# Patient Record
Sex: FEMALE | Race: WHITE | Employment: UNEMPLOYED | URBAN - METROPOLITAN AREA
[De-identification: names, ages, dates, MRNs, and addresses within clinical notes are randomized per-mention and may not be internally consistent; named-entity substitution may affect disease eponyms.]

---

## 2017-08-30 ENCOUNTER — PATIENT OUTREACH (OUTPATIENT)
Dept: FAMILY MEDICINE CLINIC | Facility: CLINIC | Age: 42
End: 2017-08-30

## 2017-10-10 ENCOUNTER — HOSPITAL ENCOUNTER (OUTPATIENT)
Age: 42
Discharge: HOME | End: 2017-10-10
Attending: FAMILY MEDICINE
Payer: COMMERCIAL

## 2017-10-10 ENCOUNTER — APPOINTMENT (OUTPATIENT)
Dept: CT IMAGING | Age: 42
End: 2017-10-10
Attending: FAMILY MEDICINE
Payer: COMMERCIAL

## 2017-10-10 VITALS
DIASTOLIC BLOOD PRESSURE: 68 MMHG | TEMPERATURE: 98 F | HEART RATE: 70 BPM | SYSTOLIC BLOOD PRESSURE: 120 MMHG | RESPIRATION RATE: 16 BRPM | OXYGEN SATURATION: 100 %

## 2017-10-10 DIAGNOSIS — N83.202 CYSTS OF BOTH OVARIES: ICD-10-CM

## 2017-10-10 DIAGNOSIS — R10.12 ABDOMINAL PAIN, LEFT UPPER QUADRANT: ICD-10-CM

## 2017-10-10 DIAGNOSIS — R07.81 RIB PAIN ON LEFT SIDE: Primary | ICD-10-CM

## 2017-10-10 DIAGNOSIS — K80.20 CALCULUS OF GALLBLADDER WITHOUT CHOLECYSTITIS WITHOUT OBSTRUCTION: ICD-10-CM

## 2017-10-10 DIAGNOSIS — N83.201 CYSTS OF BOTH OVARIES: ICD-10-CM

## 2017-10-10 PROCEDURE — 83690 ASSAY OF LIPASE: CPT | Performed by: FAMILY MEDICINE

## 2017-10-10 PROCEDURE — 85025 COMPLETE CBC W/AUTO DIFF WBC: CPT | Performed by: FAMILY MEDICINE

## 2017-10-10 PROCEDURE — 74177 CT ABD & PELVIS W/CONTRAST: CPT | Performed by: FAMILY MEDICINE

## 2017-10-10 PROCEDURE — 80076 HEPATIC FUNCTION PANEL: CPT | Performed by: FAMILY MEDICINE

## 2017-10-10 PROCEDURE — 81002 URINALYSIS NONAUTO W/O SCOPE: CPT | Performed by: FAMILY MEDICINE

## 2017-10-10 PROCEDURE — 80047 BASIC METABLC PNL IONIZED CA: CPT

## 2017-10-10 PROCEDURE — 99215 OFFICE O/P EST HI 40 MIN: CPT

## 2017-10-10 PROCEDURE — 81025 URINE PREGNANCY TEST: CPT | Performed by: FAMILY MEDICINE

## 2017-10-10 PROCEDURE — 99204 OFFICE O/P NEW MOD 45 MIN: CPT

## 2017-10-10 PROCEDURE — 36415 COLL VENOUS BLD VENIPUNCTURE: CPT

## 2017-10-10 RX ORDER — METAXALONE 800 MG/1
800 TABLET ORAL 3 TIMES DAILY
Qty: 21 TABLET | Refills: 0 | Status: SHIPPED | OUTPATIENT
Start: 2017-10-10 | End: 2017-10-16

## 2017-10-10 NOTE — ED INITIAL ASSESSMENT (HPI)
Patient c/o intermittent left side abd/ribcage pain that started about a week ago. Pain is worse after eating.  Denies n/v/d.

## 2017-10-11 NOTE — ED PROVIDER NOTES
Patient Seen in: [address] [facility]    History   Patient presents with:  Abdominal Pain    Stated Complaint: left side pain    HPI    50-year-old female who presents to the clinic today with chief complaints of intermittent left-sided upper [10/10/17 1501]  Pulse: 67 [10/10/17 1501]  Resp: 16 [10/10/17 1501]  Temp: (!) 97 °F (36.1 °C) [10/10/17 1501]  Temp src: Temporal [10/10/17 1501]  SpO2: 100 % [10/10/17 1501]  O2 Device: None (Room air) [10/10/17 1557]    Current:/68   Pulse 70   T symphysis with non-ionic intravenous contrast material. Post contrast coronal MPR imaging was performed. Dose reduction techniques were used.  Dose information is transmitted to the ACR ([address] [place] [address] of Radiology) Renae Dunham 35 ([facility] CBC was unremarkable  I-STAT unremarkable  UA negative  Urine pregnancy negative  CT abdomen and pelvis positive for extensive cholelithiasis. Bilateral ovarian cysts. Hepatic function panel ordered. Lipase ordered.     60-year-old female presenting Medication List as of 10/10/2017  5:52 PM    START taking these medications    Metaxalone (SKELAXIN) 800 MG Oral Tab  Take 1 tablet (800 mg total) by mouth 3 (three) times daily. , Normal, Disp-21 tablet, R-0

## 2017-10-12 ENCOUNTER — TELEPHONE (OUTPATIENT)
Dept: FAMILY MEDICINE CLINIC | Facility: CLINIC | Age: 42
End: 2017-10-12

## 2017-10-12 ENCOUNTER — OFFICE VISIT (OUTPATIENT)
Dept: FAMILY MEDICINE CLINIC | Facility: CLINIC | Age: 42
End: 2017-10-12

## 2017-10-12 VITALS
DIASTOLIC BLOOD PRESSURE: 74 MMHG | HEART RATE: 120 BPM | TEMPERATURE: 99 F | WEIGHT: 141.63 LBS | SYSTOLIC BLOOD PRESSURE: 118 MMHG | HEIGHT: 64 IN | RESPIRATION RATE: 14 BRPM | BODY MASS INDEX: 24.18 KG/M2 | OXYGEN SATURATION: 100 %

## 2017-10-12 DIAGNOSIS — K29.70 GASTRITIS WITHOUT BLEEDING, UNSPECIFIED CHRONICITY, UNSPECIFIED GASTRITIS TYPE: ICD-10-CM

## 2017-10-12 DIAGNOSIS — R10.13 EPIGASTRIC PAIN: ICD-10-CM

## 2017-10-12 DIAGNOSIS — K80.80 BILIARY CALCULUS OF OTHER SITE WITHOUT OBSTRUCTION: Primary | ICD-10-CM

## 2017-10-12 DIAGNOSIS — K29.50 CHRONIC GASTRITIS WITHOUT BLEEDING, UNSPECIFIED GASTRITIS TYPE: ICD-10-CM

## 2017-10-12 DIAGNOSIS — K80.20 CALCULUS OF GALLBLADDER WITHOUT CHOLECYSTITIS WITHOUT OBSTRUCTION: Primary | ICD-10-CM

## 2017-10-12 PROCEDURE — 99213 OFFICE O/P EST LOW 20 MIN: CPT | Performed by: FAMILY MEDICINE

## 2017-10-12 NOTE — PROGRESS NOTES
Nola Carlson is a 40year old female. No chief complaint on file. HPI:   Pain and discomfort started a week ago, worse with food. Cramping under her ribs. Decreased appetite. No fevers, chills or sweats.  Even water hurts her, just a few minutes 9.6 oz   SpO2 100%   BMI 24.31 kg/m²  Body mass index is 24.31 kg/m².     GENERAL: well developed, well nourished,in no apparent distress  SKIN: no rashes,no suspicious lesions  HEENT: atraumatic, normocephalic,ears and throat are clear  NECK: supple,no makeda

## 2017-10-12 NOTE — TELEPHONE ENCOUNTER
Patient notified and verbalized understanding.    Contact info for Dr Hu Del Real and Dr Otis De provided

## 2017-10-12 NOTE — TELEPHONE ENCOUNTER
Patient states the first available appointment is 10/25 with Dr Melinda Whaley and patient would like to get in with someone sooner.     Patient would also like the referral for GI

## 2017-10-12 NOTE — TELEPHONE ENCOUNTER
Referrals placed. For surgery: Dr. Deangelo Mcpherson, or one of her partners might be able to get her in sooner. Otherwise, two weeks isn't terrible if you have to wait. GI: Dr. Jessica Christensen.  I would see the surgeon first.

## 2017-10-12 NOTE — TELEPHONE ENCOUNTER
Pt called stated she has a gallbladder consult appt in [facility] on 10/25 and she was wondering if she could get in sooner than that.

## 2017-10-12 NOTE — TELEPHONE ENCOUNTER
Phone call to patient. States she was scheduled sooner with another surgeon.   Future Appointments  Date Time Provider Kelsey Watt   10/16/2017 10:30 AM More Curran MD [facility] General

## 2017-10-12 NOTE — TELEPHONE ENCOUNTER
Pt called, does Dr. Divine Velasco want to refer pt to a gastro Dr?  And can Dr. Divine Velasco give pt the name of a different surgeon for the gallbladder issue?   Please call pt at [phone]

## 2017-10-16 ENCOUNTER — OFFICE VISIT (OUTPATIENT)
Dept: SURGERY | Facility: CLINIC | Age: 42
End: 2017-10-16

## 2017-10-16 ENCOUNTER — TELEPHONE (OUTPATIENT)
Dept: SURGERY | Facility: CLINIC | Age: 42
End: 2017-10-16

## 2017-10-16 VITALS
TEMPERATURE: 98 F | DIASTOLIC BLOOD PRESSURE: 83 MMHG | BODY MASS INDEX: 24.07 KG/M2 | SYSTOLIC BLOOD PRESSURE: 138 MMHG | WEIGHT: 141 LBS | HEIGHT: 64 IN | HEART RATE: 53 BPM

## 2017-10-16 DIAGNOSIS — K80.20 CALCULUS OF GALLBLADDER WITHOUT CHOLECYSTITIS WITHOUT OBSTRUCTION: Primary | ICD-10-CM

## 2017-10-16 DIAGNOSIS — K85.10 GALLSTONE PANCREATITIS: ICD-10-CM

## 2017-10-16 DIAGNOSIS — K80.18 CALCULUS OF GALLBLADDER WITH OTHER CHOLECYSTITIS WITHOUT OBSTRUCTION: Primary | ICD-10-CM

## 2017-10-16 PROCEDURE — 99204 OFFICE O/P NEW MOD 45 MIN: CPT | Performed by: COLON & RECTAL SURGERY

## 2017-10-16 RX ORDER — MULTIVIT-MIN/IRON FUM/FOLIC AC 7.5 MG-4
1 TABLET ORAL DAILY
COMMUNITY
End: 2019-09-26 | Stop reason: ALTCHOICE

## 2017-10-16 NOTE — H&P
New Patient Visit Note       Active Problems      1. Calculus of gallbladder without cholecystitis without obstruction    2.  Gallstone pancreatitis        Chief Complaint   Abdominal pain    History of Present Illness   Mohsen Esteves is a 28-year-old w medications    She is  and has 4 children. She currently does not work. Allergies  Corine is allergic to latex and pork derived products.     Past Medical / Surgical / Social / Family History    The past medical and past surgical history have pain and nausea. Negative for abdominal distention, anal bleeding, blood in stool, constipation, diarrhea and vomiting. Genitourinary: Negative for difficulty urinating, dysuria, frequency and urgency.    Musculoskeletal: Negative for arthralgias and myal Index Registry. PATIENT STATED HISTORY:(As transcribed by Technologist)  Patient has left upper abdomen pain in the area of left ribs. No injury. Pain is intermittent for one week. Pain is worse after eating.       CONTRAST USED:  80cc of Omnipaque 350 Calculus of gallbladder without cholecystitis without obstruction  (primary encounter diagnosis)  Gallstone pancreatitis     The patient does not have acute cholecystitis.   She likely had an episode of mild gallstone pancreatitis at the time of her prese

## 2017-10-16 NOTE — PATIENT INSTRUCTIONS
Assessment   Calculus of gallbladder without cholecystitis without obstruction  (primary encounter diagnosis)  Gallstone pancreatitis     The patient does not have acute cholecystitis.   She likely had an episode of mild gallstone pancreatitis at the time o

## 2017-11-09 ENCOUNTER — ANESTHESIA EVENT (OUTPATIENT)
Dept: SURGERY | Facility: HOSPITAL | Age: 42
End: 2017-11-09
Payer: COMMERCIAL

## 2017-11-10 ENCOUNTER — APPOINTMENT (OUTPATIENT)
Dept: GENERAL RADIOLOGY | Facility: HOSPITAL | Age: 42
End: 2017-11-10
Attending: COLON & RECTAL SURGERY
Payer: COMMERCIAL

## 2017-11-10 ENCOUNTER — ANESTHESIA (OUTPATIENT)
Dept: SURGERY | Facility: HOSPITAL | Age: 42
End: 2017-11-10
Payer: COMMERCIAL

## 2017-11-10 ENCOUNTER — SURGERY (OUTPATIENT)
Age: 42
End: 2017-11-10

## 2017-11-10 ENCOUNTER — HOSPITAL ENCOUNTER (OUTPATIENT)
Facility: HOSPITAL | Age: 42
Setting detail: HOSPITAL OUTPATIENT SURGERY
Discharge: HOME | End: 2017-11-10
Attending: COLON & RECTAL SURGERY | Admitting: COLON & RECTAL SURGERY
Payer: COMMERCIAL

## 2017-11-10 VITALS
RESPIRATION RATE: 16 BRPM | TEMPERATURE: 98 F | DIASTOLIC BLOOD PRESSURE: 64 MMHG | HEART RATE: 67 BPM | OXYGEN SATURATION: 100 % | WEIGHT: 147.06 LBS | HEIGHT: 64 IN | SYSTOLIC BLOOD PRESSURE: 111 MMHG | BODY MASS INDEX: 25.11 KG/M2

## 2017-11-10 DIAGNOSIS — K80.20 CALCULUS OF GALLBLADDER WITHOUT CHOLECYSTITIS WITHOUT OBSTRUCTION: ICD-10-CM

## 2017-11-10 DIAGNOSIS — K85.10 GALLSTONE PANCREATITIS: ICD-10-CM

## 2017-11-10 PROCEDURE — 0FT44ZZ RESECTION OF GALLBLADDER, PERCUTANEOUS ENDOSCOPIC APPROACH: ICD-10-PCS | Performed by: COLON & RECTAL SURGERY

## 2017-11-10 PROCEDURE — 74300 X-RAY BILE DUCTS/PANCREAS: CPT | Performed by: COLON & RECTAL SURGERY

## 2017-11-10 PROCEDURE — BF100ZZ FLUOROSCOPY OF BILE DUCTS USING HIGH OSMOLAR CONTRAST: ICD-10-PCS | Performed by: COLON & RECTAL SURGERY

## 2017-11-10 PROCEDURE — 47563 LAPARO CHOLECYSTECTOMY/GRAPH: CPT | Performed by: COLON & RECTAL SURGERY

## 2017-11-10 RX ORDER — HYDROMORPHONE HYDROCHLORIDE 1 MG/ML
INJECTION, SOLUTION INTRAMUSCULAR; INTRAVENOUS; SUBCUTANEOUS
Status: COMPLETED
Start: 2017-11-10 | End: 2017-11-10

## 2017-11-10 RX ORDER — ONDANSETRON 2 MG/ML
4 INJECTION INTRAMUSCULAR; INTRAVENOUS AS NEEDED
Status: DISCONTINUED | OUTPATIENT
Start: 2017-11-10 | End: 2017-11-10

## 2017-11-10 RX ORDER — NALOXONE HYDROCHLORIDE 0.4 MG/ML
80 INJECTION, SOLUTION INTRAMUSCULAR; INTRAVENOUS; SUBCUTANEOUS AS NEEDED
Status: DISCONTINUED | OUTPATIENT
Start: 2017-11-10 | End: 2017-11-10

## 2017-11-10 RX ORDER — TRAMADOL HYDROCHLORIDE 50 MG/1
50 TABLET ORAL
Qty: 30 TABLET | Refills: 0 | Status: SHIPPED | OUTPATIENT
Start: 2017-11-10 | End: 2017-12-18

## 2017-11-10 RX ORDER — MEPERIDINE HYDROCHLORIDE 25 MG/ML
12.5 INJECTION INTRAMUSCULAR; INTRAVENOUS; SUBCUTANEOUS AS NEEDED
Status: DISCONTINUED | OUTPATIENT
Start: 2017-11-10 | End: 2017-11-10

## 2017-11-10 RX ORDER — HYDROMORPHONE HYDROCHLORIDE 1 MG/ML
0.4 INJECTION, SOLUTION INTRAMUSCULAR; INTRAVENOUS; SUBCUTANEOUS EVERY 5 MIN PRN
Status: DISCONTINUED | OUTPATIENT
Start: 2017-11-10 | End: 2017-11-10

## 2017-11-10 RX ORDER — SCOLOPAMINE TRANSDERMAL SYSTEM 1 MG/1
1 PATCH, EXTENDED RELEASE TRANSDERMAL
Status: DISCONTINUED | OUTPATIENT
Start: 2017-11-10 | End: 2017-11-10

## 2017-11-10 RX ORDER — HYDROCODONE BITARTRATE AND ACETAMINOPHEN 5; 325 MG/1; MG/1
2 TABLET ORAL AS NEEDED
Status: COMPLETED | OUTPATIENT
Start: 2017-11-10 | End: 2017-11-10

## 2017-11-10 RX ORDER — BUPIVACAINE HYDROCHLORIDE AND EPINEPHRINE 5; 5 MG/ML; UG/ML
INJECTION, SOLUTION EPIDURAL; INTRACAUDAL; PERINEURAL AS NEEDED
Status: DISCONTINUED | OUTPATIENT
Start: 2017-11-10 | End: 2017-11-10 | Stop reason: HOSPADM

## 2017-11-10 RX ORDER — MIDAZOLAM HYDROCHLORIDE 1 MG/ML
1 INJECTION INTRAMUSCULAR; INTRAVENOUS EVERY 5 MIN PRN
Status: DISCONTINUED | OUTPATIENT
Start: 2017-11-10 | End: 2017-11-10

## 2017-11-10 RX ORDER — SODIUM CHLORIDE, SODIUM LACTATE, POTASSIUM CHLORIDE, CALCIUM CHLORIDE 600; 310; 30; 20 MG/100ML; MG/100ML; MG/100ML; MG/100ML
INJECTION, SOLUTION INTRAVENOUS CONTINUOUS
Status: DISCONTINUED | OUTPATIENT
Start: 2017-11-10 | End: 2017-11-10

## 2017-11-10 RX ORDER — HYDROCODONE BITARTRATE AND ACETAMINOPHEN 5; 325 MG/1; MG/1
1 TABLET ORAL AS NEEDED
Status: COMPLETED | OUTPATIENT
Start: 2017-11-10 | End: 2017-11-10

## 2017-11-10 RX ORDER — CEFOXITIN 2 G/1
INJECTION, POWDER, FOR SOLUTION INTRAVENOUS
Status: DISCONTINUED | OUTPATIENT
Start: 2017-11-10 | End: 2017-11-10 | Stop reason: HOSPADM

## 2017-11-10 NOTE — OPERATIVE REPORT
[facility]  Operative Note    Milad Rosa Location: OR   CSN [id number] MRN [id number]   Admission Date 11/10/2017 Operation Date 11/10/2017   Attending Physician Gianluca De La Vega MD Operating Physician Zan Meadows MD       Patient antibiotics were given. The abdomen was prepped and draped in the usual sterile fashion. A time-out was performed. A transverse supraumbilical incision was made. The base of the umbilicus was grasped with an Kocher clamp and elevated.   The Veress needl was elevated from the gallbladder fossa, it was extracted from the umbilical trocar site and sent to Pathology for specimen. Attention was turned to achieve hemostasis on the gallbladder fossa; this was achieved with electrocautery.  The right upper quad

## 2017-11-10 NOTE — ANESTHESIA POSTPROCEDURE EVALUATION
[facility] Patient Status:  Hospital Outpatient Surgery   Age/Gender 40year old female MRN [id number]   Location [address] Attending Pamla Ahumada, MD   Hosp Day # 0 PCP Palomo Dumont DO

## 2017-11-10 NOTE — INTERVAL H&P NOTE
Pre-op Diagnosis: Gallstone pancreatitis [K85.10]  Calculus of gallbladder without cholecystitis without obstruction [K80.20]    The above referenced H&P was reviewed by Sisi Robins MD on 11/10/2017, the patient was examined and no significant ch

## 2017-11-10 NOTE — ANESTHESIA PREPROCEDURE EVALUATION
PRE-OP EVALUATION    Patient Name: Kar Sargent    Pre-op Diagnosis: Gallstone pancreatitis [K85.10]  Calculus of gallbladder without cholecystitis without obstruction [K80.20]    Procedure(s):  LAPAROSCOPIC CHOLECYSTECTOMY WITH CHOLANGIOGRAM    Surge Dental    No notable dental history. Pulmonary    Pulmonary exam normal.                 Other findings            ASA: 1   Plan: general  NPO status verified and patient meets guidelines.           Plan/risks discussed with: patient

## 2017-11-11 ENCOUNTER — TELEPHONE (OUTPATIENT)
Dept: FAMILY MEDICINE CLINIC | Facility: CLINIC | Age: 42
End: 2017-11-11

## 2017-11-11 NOTE — TELEPHONE ENCOUNTER
Patient would like to speak with a nurse or MD regarding her issue. Patient is taking a medication for her Gallbladder and she is experiencing being very itchy (no hives) and feeling very dizzy. Is there something else that she can take.      Maria Alejandra FOLEY

## 2017-11-11 NOTE — TELEPHONE ENCOUNTER
Patient states she was given tramadol after her gallbladder was removed laparoscopically . Was given Tramadol for pain.  States tramadol makes her itch all over and feel dizzy    Discussed with Dr Sofia Nixon patient to stop tramadol and take benadryl for

## 2017-11-20 ENCOUNTER — OFFICE VISIT (OUTPATIENT)
Dept: SURGERY | Facility: CLINIC | Age: 42
End: 2017-11-20

## 2017-11-20 VITALS
DIASTOLIC BLOOD PRESSURE: 75 MMHG | HEART RATE: 81 BPM | TEMPERATURE: 99 F | HEIGHT: 64 IN | WEIGHT: 147 LBS | SYSTOLIC BLOOD PRESSURE: 116 MMHG | BODY MASS INDEX: 25.1 KG/M2

## 2017-11-20 DIAGNOSIS — Z09 FOLLOW-UP EXAMINATION: Primary | ICD-10-CM

## 2017-11-20 PROBLEM — K85.10 GALLSTONE PANCREATITIS: Status: RESOLVED | Noted: 2017-10-16 | Resolved: 2017-11-20

## 2017-11-20 PROBLEM — K80.20 CALCULUS OF GALLBLADDER WITHOUT CHOLECYSTITIS WITHOUT OBSTRUCTION: Status: RESOLVED | Noted: 2017-10-16 | Resolved: 2017-11-20

## 2017-11-20 PROCEDURE — 99024 POSTOP FOLLOW-UP VISIT: CPT | Performed by: COLON & RECTAL SURGERY

## 2017-11-20 NOTE — PROGRESS NOTES
Post Operative Visit Note       Active Problems  1.  Follow-up examination         Chief Complaint   Pain at right lower incision site     History of Present Illness   Meeta Albarran is a 52-year-old woman who underwent lap scopic cholecystectomy with in tobacco: Never Used                        Alcohol use: Yes           0.0 oz/week       Comment: social -monthly    Drug use:  No                 Current Outpatient Prescriptions:  TraMADol HCl 50 MG Oral Tab Take 1 tablet (50 mg total) by mouth every 4 to the abdomen is benign, the Steri-Strips were removed at the bedside, there is a small firmness deep to the right lower incision site with a small amount of surrounding ecchymosis, there is a minimal amount of incision separation at the lateral aspect of th weeks.    If the patient begins to have fever or chills, or notices any purulent drainage from her incisions or redness spreading from her incisions, or severe abdominal pain she should notify the office immediately.     Follow-up in 1 month for incision ch

## 2017-11-20 NOTE — PATIENT INSTRUCTIONS
Assessment   Follow-up examination  (primary encounter diagnosis)    The patient is healing well from her cholecystectomy. She appears to have developed a small hematoma deep to her right lower laparoscopy incision. There is no sign of infection.   Final

## 2017-12-18 ENCOUNTER — OFFICE VISIT (OUTPATIENT)
Dept: SURGERY | Facility: CLINIC | Age: 42
End: 2017-12-18

## 2017-12-18 VITALS
SYSTOLIC BLOOD PRESSURE: 116 MMHG | DIASTOLIC BLOOD PRESSURE: 74 MMHG | HEART RATE: 67 BPM | HEIGHT: 64 IN | BODY MASS INDEX: 24.59 KG/M2 | WEIGHT: 144 LBS | TEMPERATURE: 99 F

## 2017-12-18 DIAGNOSIS — Z09 FOLLOW-UP EXAMINATION: Primary | ICD-10-CM

## 2017-12-18 PROCEDURE — 99024 POSTOP FOLLOW-UP VISIT: CPT | Performed by: COLON & RECTAL SURGERY

## 2017-12-18 NOTE — PROGRESS NOTES
Follow Up Visit Note       Active Problems      1.  Follow-up examination          Chief Complaint   Mild skin irritation at right lower incision      History of Present Illness  Mare November is a 52-year-old woman who underwent lap scopic cholecystect Social History    Marital status:              Spouse name:                       Years of education:                 Number of children:               Social History Main Topics    Smoking status: Never Smoker palpation, all laparoscopy incisions are well-healed without any surrounding erythema or drainage, the right lower incision site no longer has a palpable firmness deep to the incision.   Skin: No rashes,  No lesions  Psych: Appropriate mood and affect, orie

## 2017-12-18 NOTE — PATIENT INSTRUCTIONS
Assessment   Follow-up examination  (primary encounter diagnosis)    The hematoma deep to the right lower incision has resolved. All her incisions are now well-healed. She is tolerating diet and having normal bowel movements.     The etiology of her left

## 2018-02-22 PROBLEM — K29.60 BILE REFLUX GASTRITIS: Status: ACTIVE | Noted: 2018-02-22

## 2018-02-22 PROCEDURE — 88305 TISSUE EXAM BY PATHOLOGIST: CPT | Performed by: INTERNAL MEDICINE

## 2018-02-25 PROBLEM — K29.70 HELICOBACTER PYLORI GASTRITIS: Status: ACTIVE | Noted: 2018-02-25

## 2018-02-25 PROBLEM — B96.81 HELICOBACTER PYLORI GASTRITIS: Status: ACTIVE | Noted: 2018-02-25

## 2018-05-29 ENCOUNTER — TELEPHONE (OUTPATIENT)
Dept: FAMILY MEDICINE CLINIC | Facility: CLINIC | Age: 43
End: 2018-05-29

## 2018-05-29 ENCOUNTER — OFFICE VISIT (OUTPATIENT)
Dept: FAMILY MEDICINE CLINIC | Facility: CLINIC | Age: 43
End: 2018-05-29

## 2018-05-29 VITALS
RESPIRATION RATE: 14 BRPM | BODY MASS INDEX: 27 KG/M2 | SYSTOLIC BLOOD PRESSURE: 120 MMHG | DIASTOLIC BLOOD PRESSURE: 80 MMHG | WEIGHT: 155.63 LBS | HEART RATE: 58 BPM | TEMPERATURE: 99 F

## 2018-05-29 DIAGNOSIS — Z12.39 SCREENING FOR BREAST CANCER: ICD-10-CM

## 2018-05-29 DIAGNOSIS — L03.032 PARONYCHIA OF GREAT TOE OF LEFT FOOT: Primary | ICD-10-CM

## 2018-05-29 PROCEDURE — 99214 OFFICE O/P EST MOD 30 MIN: CPT | Performed by: FAMILY MEDICINE

## 2018-05-29 NOTE — TELEPHONE ENCOUNTER
Pt forgot to ask for an order for a mammo, She would like to get it done soon. Please return call when order is placed for pt to schedule.

## 2018-06-12 ENCOUNTER — PATIENT MESSAGE (OUTPATIENT)
Dept: FAMILY MEDICINE CLINIC | Facility: CLINIC | Age: 43
End: 2018-06-12

## 2018-06-12 DIAGNOSIS — N30.00 ACUTE CYSTITIS WITHOUT HEMATURIA: Primary | ICD-10-CM

## 2018-06-13 ENCOUNTER — NURSE ONLY (OUTPATIENT)
Dept: FAMILY MEDICINE CLINIC | Facility: CLINIC | Age: 43
End: 2018-06-13

## 2018-06-13 DIAGNOSIS — N30.00 ACUTE CYSTITIS WITHOUT HEMATURIA: ICD-10-CM

## 2018-06-13 PROCEDURE — 87086 URINE CULTURE/COLONY COUNT: CPT | Performed by: FAMILY MEDICINE

## 2018-06-13 RX ORDER — NITROFURANTOIN MACROCRYSTALS 100 MG/1
100 CAPSULE ORAL 2 TIMES DAILY
Qty: 14 CAPSULE | Refills: 0 | Status: CANCELLED | OUTPATIENT
Start: 2018-06-13

## 2018-06-13 RX ORDER — NITROFURANTOIN 25; 75 MG/1; MG/1
100 CAPSULE ORAL 2 TIMES DAILY
Qty: 14 CAPSULE | Refills: 0 | Status: SHIPPED | OUTPATIENT
Start: 2018-06-13 | End: 2018-06-19 | Stop reason: ALTCHOICE

## 2018-06-13 NOTE — PROGRESS NOTES
Patient presents today for nurse visit to provide urine sample for urine culture. Urine collected in grey tube. See documentation from patient email from 6/13/18. Patient requested medication be sent to [facility] on [address]. Medication sent to pharmacy.  Ana

## 2018-06-13 NOTE — TELEPHONE ENCOUNTER
Can you call pt and confirm symptoms of a UTI and see if she can come in for a RN visit to give a urine culture ? If she has dysuria/urgency/frequency, then you can send a script for nitrofurantoin 100 mg bid x 7 days.  I'd like to get a culture to make s

## 2018-06-13 NOTE — TELEPHONE ENCOUNTER
See nurse visit from 6/13/18. Medication sent to [facility] on [address] per patient request. Urine sent for culture.

## 2018-06-13 NOTE — TELEPHONE ENCOUNTER
Regarding: FW: Prescription Question  Contact: [phone]   Please read my note and call pt. Thanks.  KLE     ----- Message -----  From: Lavern Payne RN  Sent: 6/13/2018  12:14 PM  To: Julio Luna DO  Subject: Prescription Question

## 2018-06-13 NOTE — TELEPHONE ENCOUNTER
From: Roxann Najjar  To: Lesly Rojas DO  Sent: 6/12/2018 6:00 PM CDT  Subject: Prescription Question    I took a home test for uti and it was positive for Leukocytes 125++  Will I need antibiotic treatment?

## 2018-06-19 ENCOUNTER — OFFICE VISIT (OUTPATIENT)
Dept: FAMILY MEDICINE CLINIC | Facility: CLINIC | Age: 43
End: 2018-06-19

## 2018-06-19 VITALS
WEIGHT: 150.38 LBS | DIASTOLIC BLOOD PRESSURE: 72 MMHG | SYSTOLIC BLOOD PRESSURE: 100 MMHG | TEMPERATURE: 99 F | BODY MASS INDEX: 26 KG/M2 | RESPIRATION RATE: 16 BRPM | HEART RATE: 68 BPM

## 2018-06-19 DIAGNOSIS — L03.032 PARONYCHIA OF GREAT TOE, LEFT: Primary | ICD-10-CM

## 2018-06-19 DIAGNOSIS — R07.81 RIB PAIN ON RIGHT SIDE: ICD-10-CM

## 2018-06-19 PROCEDURE — 87147 CULTURE TYPE IMMUNOLOGIC: CPT | Performed by: FAMILY MEDICINE

## 2018-06-19 PROCEDURE — 87205 SMEAR GRAM STAIN: CPT | Performed by: FAMILY MEDICINE

## 2018-06-19 PROCEDURE — 87186 SC STD MICRODIL/AGAR DIL: CPT | Performed by: FAMILY MEDICINE

## 2018-06-19 PROCEDURE — 87070 CULTURE OTHR SPECIMN AEROBIC: CPT | Performed by: FAMILY MEDICINE

## 2018-06-19 PROCEDURE — 87077 CULTURE AEROBIC IDENTIFY: CPT | Performed by: FAMILY MEDICINE

## 2018-06-19 PROCEDURE — 99214 OFFICE O/P EST MOD 30 MIN: CPT | Performed by: FAMILY MEDICINE

## 2018-06-19 RX ORDER — SULFAMETHOXAZOLE AND TRIMETHOPRIM 800; 160 MG/1; MG/1
1 TABLET ORAL 2 TIMES DAILY
Qty: 14 TABLET | Refills: 0 | Status: SHIPPED | OUTPATIENT
Start: 2018-06-19 | End: 2018-06-25

## 2018-06-19 NOTE — PROGRESS NOTES
Grey Huff is a 44year old female. Patient presents with: Follow - Up: per pt, follow up visit      HPI:   Left great toe is not getting better. She finished 10 days of keflex 10 days ago. Not better. Drains more with soaking. Painful to step on. kg/m².      GENERAL: well developed, well nourished,in no apparent distress  SKIN: no rashes,no suspicious lesions other than erythema and swelling at nail bed of left great toe, draining clear fluid, some pus, culture was taken   HEENT: atraumatic, normoc

## 2019-04-05 ENCOUNTER — OFFICE VISIT (OUTPATIENT)
Dept: FAMILY MEDICINE CLINIC | Facility: CLINIC | Age: 44
End: 2019-04-05
Payer: COMMERCIAL

## 2019-04-05 VITALS
DIASTOLIC BLOOD PRESSURE: 78 MMHG | BODY MASS INDEX: 25.61 KG/M2 | RESPIRATION RATE: 18 BRPM | OXYGEN SATURATION: 99 % | HEIGHT: 64 IN | TEMPERATURE: 100 F | SYSTOLIC BLOOD PRESSURE: 112 MMHG | HEART RATE: 104 BPM | WEIGHT: 150 LBS

## 2019-04-05 DIAGNOSIS — J02.9 SORE THROAT: ICD-10-CM

## 2019-04-05 DIAGNOSIS — J02.0 STREP THROAT: Primary | ICD-10-CM

## 2019-04-05 PROCEDURE — 99213 OFFICE O/P EST LOW 20 MIN: CPT | Performed by: NURSE PRACTITIONER

## 2019-04-05 PROCEDURE — 87880 STREP A ASSAY W/OPTIC: CPT | Performed by: NURSE PRACTITIONER

## 2019-04-05 RX ORDER — AMOXICILLIN 500 MG/1
500 TABLET, FILM COATED ORAL 2 TIMES DAILY
Qty: 20 TABLET | Refills: 0 | Status: SHIPPED | OUTPATIENT
Start: 2019-04-05 | End: 2019-04-15

## 2019-04-05 NOTE — PATIENT INSTRUCTIONS
Pharyngitis: Strep (Confirmed)    You have had a positive test for strep throat. Strep throat is a contagious illness. It is spread by coughing, kissing or by touching others after touching your mouth or nose.  Symptoms include throat pain that is worse w · Lymph nodes getting larger or becoming soft in the middle  · You can't swallow liquids or you can't open your mouth wide because of throat pain  · Signs of dehydration. These include very dark urine or no urine, sunken eyes, and dizziness.   · Trouble moises

## 2019-04-05 NOTE — PROGRESS NOTES
CHIEF COMPLAINT:   Patient presents with:  Sore Throat: congestion/body aches x 2 day      HPI:   Esteban Vásquez is a 44year old female presents to clinic with symptoms of sore throat. Patient has had for 2 days.  Symptoms have been worsening since onse THROAT: oral mucosa pink, moist. Posterior pharynx erythematous and injected. pos exudates. Tonsils 3/4. Breath pos malodorous. No uvular deviation. No drooling. NECK: supple  LUNGS: clear to auscultation bilaterally, no wheezes or rhonchi.  Breathing is You have had a positive test for strep throat. Strep throat is a contagious illness. It is spread by coughing, kissing or by touching others after touching your mouth or nose.  Symptoms include throat pain that is worse with swallowing, aching all over, hea · You can't swallow liquids or you can't open your mouth wide because of throat pain  · Signs of dehydration. These include very dark urine or no urine, sunken eyes, and dizziness.   · Trouble breathing or noisy breathing  · Muffled voice  · Rash  Preventio

## 2019-09-26 ENCOUNTER — OFFICE VISIT (OUTPATIENT)
Dept: FAMILY MEDICINE CLINIC | Facility: CLINIC | Age: 44
End: 2019-09-26
Payer: COMMERCIAL

## 2019-09-26 VITALS
HEIGHT: 64 IN | BODY MASS INDEX: 26.98 KG/M2 | SYSTOLIC BLOOD PRESSURE: 130 MMHG | WEIGHT: 158 LBS | HEART RATE: 64 BPM | DIASTOLIC BLOOD PRESSURE: 80 MMHG | RESPIRATION RATE: 16 BRPM | TEMPERATURE: 98 F

## 2019-09-26 DIAGNOSIS — R52 GENERALIZED BODY ACHES: Primary | ICD-10-CM

## 2019-09-26 DIAGNOSIS — M89.8X9 BONE PAIN: ICD-10-CM

## 2019-09-26 DIAGNOSIS — R53.82 CHRONIC FATIGUE: ICD-10-CM

## 2019-09-26 LAB
ALBUMIN SERPL-MCNC: 4.5 G/DL (ref 3.4–5)
ALBUMIN/GLOB SERPL: 1.3 {RATIO} (ref 1–2)
ALP LIVER SERPL-CCNC: 66 U/L (ref 37–98)
ALT SERPL-CCNC: 26 U/L (ref 13–56)
ANION GAP SERPL CALC-SCNC: 5 MMOL/L (ref 0–18)
APPEARANCE: CLEAR
AST SERPL-CCNC: 23 U/L (ref 15–37)
BASOPHILS # BLD AUTO: 0.02 X10(3) UL (ref 0–0.2)
BASOPHILS NFR BLD AUTO: 0.4 %
BILIRUB SERPL-MCNC: 0.6 MG/DL (ref 0.1–2)
BILIRUBIN: NEGATIVE
BUN BLD-MCNC: 13 MG/DL (ref 7–18)
BUN/CREAT SERPL: 15.7 (ref 10–20)
CALCIUM BLD-MCNC: 9.3 MG/DL (ref 8.5–10.1)
CHLORIDE SERPL-SCNC: 107 MMOL/L (ref 98–112)
CO2 SERPL-SCNC: 26 MMOL/L (ref 21–32)
CREAT BLD-MCNC: 0.83 MG/DL (ref 0.55–1.02)
CRP SERPL-MCNC: <0.29 MG/DL (ref ?–0.3)
DEPRECATED HBV CORE AB SER IA-ACNC: 84.8 NG/ML (ref 12–240)
DEPRECATED RDW RBC AUTO: 46.5 FL (ref 35.1–46.3)
EOSINOPHIL # BLD AUTO: 0.03 X10(3) UL (ref 0–0.7)
EOSINOPHIL NFR BLD AUTO: 0.7 %
ERYTHROCYTE [DISTWIDTH] IN BLOOD BY AUTOMATED COUNT: 13.7 % (ref 11–15)
GLOBULIN PLAS-MCNC: 3.5 G/DL (ref 2.8–4.4)
GLUCOSE (URINE DIPSTICK): NEGATIVE MG/DL
GLUCOSE BLD-MCNC: 96 MG/DL (ref 70–99)
HCT VFR BLD AUTO: 38.1 % (ref 35–48)
HGB BLD-MCNC: 12.7 G/DL (ref 12–16)
IMM GRANULOCYTES # BLD AUTO: 0.01 X10(3) UL (ref 0–1)
IMM GRANULOCYTES NFR BLD: 0.2 %
KETONES (URINE DIPSTICK): NEGATIVE MG/DL
LEUKOCYTES: NEGATIVE
LYMPHOCYTES # BLD AUTO: 1.49 X10(3) UL (ref 1–4)
LYMPHOCYTES NFR BLD AUTO: 33 %
M PROTEIN MFR SERPL ELPH: 8 G/DL (ref 6.4–8.2)
MCH RBC QN AUTO: 30.6 PG (ref 26–34)
MCHC RBC AUTO-ENTMCNC: 33.3 G/DL (ref 31–37)
MCV RBC AUTO: 91.8 FL (ref 80–100)
MONOCYTES # BLD AUTO: 0.21 X10(3) UL (ref 0.1–1)
MONOCYTES NFR BLD AUTO: 4.6 %
MULTISTIX LOT#: NORMAL NUMERIC
NEUTROPHILS # BLD AUTO: 2.76 X10 (3) UL (ref 1.5–7.7)
NEUTROPHILS # BLD AUTO: 2.76 X10(3) UL (ref 1.5–7.7)
NEUTROPHILS NFR BLD AUTO: 61.1 %
NITRITE, URINE: NEGATIVE
OCCULT BLOOD: NEGATIVE
OSMOLALITY SERPL CALC.SUM OF ELEC: 286 MOSM/KG (ref 275–295)
PH, URINE: 6 (ref 4.5–8)
PLATELET # BLD AUTO: 234 10(3)UL (ref 150–450)
POTASSIUM SERPL-SCNC: 4.2 MMOL/L (ref 3.5–5.1)
PROTEIN (URINE DIPSTICK): NEGATIVE MG/DL
RBC # BLD AUTO: 4.15 X10(6)UL (ref 3.8–5.3)
SED RATE-ML: 13 MM/HR (ref 0–25)
SODIUM SERPL-SCNC: 138 MMOL/L (ref 136–145)
SPECIFIC GRAVITY: 1.01 (ref 1–1.03)
TSI SER-ACNC: 1.85 MIU/ML (ref 0.36–3.74)
URINE-COLOR: YELLOW
UROBILINOGEN,SEMI-QN: 0.2 MG/DL (ref 0–1.9)
VIT D+METAB SERPL-MCNC: 22.9 NG/ML (ref 30–100)
WBC # BLD AUTO: 4.5 X10(3) UL (ref 4–11)

## 2019-09-26 PROCEDURE — 86140 C-REACTIVE PROTEIN: CPT | Performed by: FAMILY MEDICINE

## 2019-09-26 PROCEDURE — 99214 OFFICE O/P EST MOD 30 MIN: CPT | Performed by: FAMILY MEDICINE

## 2019-09-26 PROCEDURE — 85652 RBC SED RATE AUTOMATED: CPT | Performed by: FAMILY MEDICINE

## 2019-09-26 PROCEDURE — 82306 VITAMIN D 25 HYDROXY: CPT | Performed by: FAMILY MEDICINE

## 2019-09-26 PROCEDURE — 81003 URINALYSIS AUTO W/O SCOPE: CPT | Performed by: FAMILY MEDICINE

## 2019-09-26 PROCEDURE — 82728 ASSAY OF FERRITIN: CPT | Performed by: FAMILY MEDICINE

## 2019-09-26 PROCEDURE — 36415 COLL VENOUS BLD VENIPUNCTURE: CPT | Performed by: FAMILY MEDICINE

## 2019-09-26 PROCEDURE — 80050 GENERAL HEALTH PANEL: CPT | Performed by: FAMILY MEDICINE

## 2019-09-26 NOTE — PROGRESS NOTES
Samuel Maya is a 38year old female. Patient presents with: Body ache and/or chills: headaches, head feels foggy      HPI:   Body aches, fatigue, bones hurt. Started 3 weeks ago, not getting better.  Arms, joints and shins are the worst. Aches come lb      REVIEW OF SYSTEMS:   GENERAL HEALTH: feels well no complaints other than above   SKIN: denies any unusual skin lesions or rashes  RESPIRATORY: denies shortness of breath with exertion  CARDIOVASCULAR: denies chest pain on exertion  GI: denies abdom Future  -     FERRITIN; Future  -     VENIPUNCTURE  -     URINALYSIS, AUTO, W/O SCOPE        Orders Placed This Encounter      CBC With Differential With Platelet          Standing Status: Future          Standing Expiration Date: 9/26/2020      Comp Metab

## 2019-09-27 ENCOUNTER — TELEPHONE (OUTPATIENT)
Dept: FAMILY MEDICINE CLINIC | Facility: CLINIC | Age: 44
End: 2019-09-27

## 2019-09-27 DIAGNOSIS — G43.709 CHRONIC MIGRAINE WITHOUT AURA WITHOUT STATUS MIGRAINOSUS, NOT INTRACTABLE: ICD-10-CM

## 2019-09-27 DIAGNOSIS — R63.5 WEIGHT GAIN: Primary | ICD-10-CM

## 2019-09-27 DIAGNOSIS — E55.9 VITAMIN D DEFICIENCY: Primary | ICD-10-CM

## 2019-09-27 DIAGNOSIS — R53.82 CHRONIC FATIGUE: ICD-10-CM

## 2019-09-27 RX ORDER — BUPROPION HYDROCHLORIDE 150 MG/1
150 TABLET ORAL DAILY
Qty: 30 TABLET | Refills: 1 | Status: SHIPPED | OUTPATIENT
Start: 2019-09-27 | End: 2019-10-18

## 2019-09-27 RX ORDER — KETOPROFEN 50 MG/1
50 CAPSULE ORAL 3 TIMES DAILY PRN
Qty: 10 CAPSULE | Refills: 0 | Status: SHIPPED | OUTPATIENT
Start: 2019-09-27 | End: 2019-10-02

## 2019-09-27 RX ORDER — ERGOCALCIFEROL 1.25 MG/1
50000 CAPSULE ORAL WEEKLY
Qty: 12 CAPSULE | Refills: 0 | Status: SHIPPED | OUTPATIENT
Start: 2019-09-27 | End: 2020-03-12

## 2019-09-27 NOTE — TELEPHONE ENCOUNTER
Script sent for welbutrin. She can take ketoprofen as needed, but take it only as needed and with food. If you have more than one migraine per week, then we should talk in the office about other options. I  Sent a script for a few tabs.

## 2019-09-27 NOTE — TELEPHONE ENCOUNTER
There are lots of different kinds of meds we can try. Some have serious risks of side effects that I would like to discuss in the office. If you want to try one, I would advise welbutrin.  That is a mild antidepressant that can help curb appetite and h

## 2019-09-27 NOTE — TELEPHONE ENCOUNTER
Patient states she is eating a health diet and working out 1.5 hours daily m-f but she keeps gaining weight. States she rarely eats out and cooks health foods at home. Wants to know if there is something KE can give to help with weight loss.

## 2019-09-27 NOTE — TELEPHONE ENCOUNTER
Patient notified and verbalized understanding. States she would like to try welbutrin. [facility]ses [facility] [address]    Also states her neighbor gave her ketoprofen 50 mg and it helped with her migraine.  Wants to know if that is something KE thinks would be ok

## 2019-10-01 ENCOUNTER — TELEPHONE (OUTPATIENT)
Dept: FAMILY MEDICINE CLINIC | Facility: CLINIC | Age: 44
End: 2019-10-01

## 2019-10-01 DIAGNOSIS — G43.009 MIGRAINE WITHOUT AURA AND WITHOUT STATUS MIGRAINOSUS, NOT INTRACTABLE: Primary | ICD-10-CM

## 2019-10-01 RX ORDER — DICLOFENAC SODIUM 25 MG/1
25 TABLET, DELAYED RELEASE ORAL 2 TIMES DAILY
Qty: 10 TABLET | Refills: 0 | Status: SHIPPED | OUTPATIENT
Start: 2019-10-01 | End: 2021-05-25 | Stop reason: ALTCHOICE

## 2019-10-01 NOTE — TELEPHONE ENCOUNTER
Fax from pharmacy stating ketoprofen needs PA. Please advise if PA to be done or alternative to be sent.

## 2019-10-02 NOTE — TELEPHONE ENCOUNTER
Called and spoke with Jin Nagy at [facility]. He advised that the Diclofenac did go thru with insurance. Cost is $10. Pharmacy will delete the Ketoprofen.

## 2019-10-18 DIAGNOSIS — R53.82 CHRONIC FATIGUE: ICD-10-CM

## 2019-10-18 DIAGNOSIS — R63.5 WEIGHT GAIN: ICD-10-CM

## 2019-10-18 RX ORDER — BUPROPION HYDROCHLORIDE 150 MG/1
TABLET ORAL
Qty: 90 TABLET | Refills: 1 | Status: SHIPPED | OUTPATIENT
Start: 2019-10-18 | End: 2020-03-09

## 2020-01-06 ENCOUNTER — TELEPHONE (OUTPATIENT)
Dept: FAMILY MEDICINE CLINIC | Facility: CLINIC | Age: 45
End: 2020-01-06

## 2020-01-06 NOTE — TELEPHONE ENCOUNTER
Letter mailed to patient reminding her she is due for labs.     Lab Frequency Next Occurrence   VITAMIN D, 25-HYDROXY Once 12/27/2019

## 2020-02-13 NOTE — OR NURSING
Will complete CMP per Anesthesia guidelines by ordering BMP for stat admit. Patient lives 1 hour away.
yes

## 2020-03-08 DIAGNOSIS — R53.82 CHRONIC FATIGUE: ICD-10-CM

## 2020-03-08 DIAGNOSIS — R63.5 WEIGHT GAIN: ICD-10-CM

## 2020-03-09 RX ORDER — BUPROPION HYDROCHLORIDE 150 MG/1
TABLET ORAL
Qty: 90 TABLET | Refills: 1 | Status: SHIPPED | OUTPATIENT
Start: 2020-03-09 | End: 2020-06-16

## 2020-03-09 NOTE — TELEPHONE ENCOUNTER
Routing to provider per protocol. Last refilled on 10/18/19 for # 90 with 1 rf. Last seen on 9/26/19. No future appointments. Thank you.

## 2020-05-01 ENCOUNTER — TELEPHONE (OUTPATIENT)
Dept: FAMILY MEDICINE CLINIC | Facility: CLINIC | Age: 45
End: 2020-05-01

## 2020-05-01 NOTE — TELEPHONE ENCOUNTER
Mayelin Noel verbally consents to a Virtual/Telephone Check-In service on 05-04-20  Patient understands and accepts financial responsibility for any deductible, co-insurance and/or co-pays associated with this service

## 2020-05-04 ENCOUNTER — VIRTUAL PHONE E/M (OUTPATIENT)
Dept: FAMILY MEDICINE CLINIC | Facility: CLINIC | Age: 45
End: 2020-05-04
Payer: COMMERCIAL

## 2020-05-04 DIAGNOSIS — R42 VERTIGO: ICD-10-CM

## 2020-05-04 DIAGNOSIS — J01.00 ACUTE NON-RECURRENT MAXILLARY SINUSITIS: Primary | ICD-10-CM

## 2020-05-04 DIAGNOSIS — H93.8X2 PRESSURE SENSATION IN LEFT EAR: ICD-10-CM

## 2020-05-04 DIAGNOSIS — J34.89 SINUS DRAINAGE: ICD-10-CM

## 2020-05-04 PROCEDURE — 99214 OFFICE O/P EST MOD 30 MIN: CPT | Performed by: FAMILY MEDICINE

## 2020-05-04 RX ORDER — FLUTICASONE PROPIONATE 50 MCG
2 SPRAY, SUSPENSION (ML) NASAL DAILY
Qty: 1 BOTTLE | Refills: 5 | Status: SHIPPED | OUTPATIENT
Start: 2020-05-04 | End: 2021-04-29

## 2020-05-04 RX ORDER — AMOXICILLIN AND CLAVULANATE POTASSIUM 875; 125 MG/1; MG/1
1 TABLET, FILM COATED ORAL 2 TIMES DAILY
Qty: 20 TABLET | Refills: 0 | Status: SHIPPED | OUTPATIENT
Start: 2020-05-04 | End: 2020-05-14

## 2020-05-04 NOTE — PROGRESS NOTES
Virtual Telephone Check-In    Nils Haley verbally consents to a Virtual/Telephone Check-In visit on 05/04/20. Patient understands and accepts financial responsibility for any deductible, co-insurance and/or co-pays associated with this service. ENDOSCOPY,BIOPSY  02/2018    gastritis      Family History   Problem Relation Age of Onset   • Hypertension Mother    • Other (Other) Son         asthma, allergies   • Hypertension Maternal Grandmother    • Other (stroke) Maternal Grandmother    • Hyperten

## 2020-06-10 ENCOUNTER — E-VISIT (OUTPATIENT)
Dept: FAMILY MEDICINE CLINIC | Facility: CLINIC | Age: 45
End: 2020-06-10

## 2020-06-10 DIAGNOSIS — R30.0 DYSURIA: Primary | ICD-10-CM

## 2020-06-10 PROCEDURE — 99421 OL DIG E/M SVC 5-10 MIN: CPT | Performed by: NURSE PRACTITIONER

## 2020-06-10 RX ORDER — NITROFURANTOIN 25; 75 MG/1; MG/1
100 CAPSULE ORAL 2 TIMES DAILY
Qty: 14 CAPSULE | Refills: 0 | Status: SHIPPED | OUTPATIENT
Start: 2020-06-10 | End: 2020-06-17

## 2020-06-16 ENCOUNTER — OFFICE VISIT (OUTPATIENT)
Dept: FAMILY MEDICINE CLINIC | Facility: CLINIC | Age: 45
End: 2020-06-16
Payer: COMMERCIAL

## 2020-06-16 VITALS
DIASTOLIC BLOOD PRESSURE: 84 MMHG | HEART RATE: 76 BPM | SYSTOLIC BLOOD PRESSURE: 130 MMHG | WEIGHT: 153.38 LBS | BODY MASS INDEX: 26 KG/M2 | TEMPERATURE: 98 F

## 2020-06-16 DIAGNOSIS — Z30.09 BIRTH CONTROL COUNSELING: ICD-10-CM

## 2020-06-16 DIAGNOSIS — R53.82 CHRONIC FATIGUE: ICD-10-CM

## 2020-06-16 DIAGNOSIS — R10.2 PELVIC PAIN: Primary | ICD-10-CM

## 2020-06-16 DIAGNOSIS — F41.9 ANXIETY: ICD-10-CM

## 2020-06-16 DIAGNOSIS — R63.5 WEIGHT GAIN: ICD-10-CM

## 2020-06-16 PROCEDURE — 99214 OFFICE O/P EST MOD 30 MIN: CPT | Performed by: FAMILY MEDICINE

## 2020-06-16 RX ORDER — BUPROPION HYDROCHLORIDE 300 MG/1
300 TABLET ORAL DAILY
Qty: 30 TABLET | Refills: 2 | Status: SHIPPED | OUTPATIENT
Start: 2020-06-16 | End: 2020-09-10

## 2020-06-16 NOTE — PROGRESS NOTES
Caryn Davila is a 41year old female. No chief complaint on file. HPI:   Stomach is bloating. Pain in lower left abd, gets pain that go down her leg. She feels bloated, like she is retaining water.    This feels different than what she had two yr kg)  09/26/19 : 158 lb (71.7 kg)  04/05/19 : 150 lb (68 kg)  06/19/18 : 150 lb 6.4 oz (68.2 kg)  05/29/18 : 155 lb 9.6 oz (70.6 kg)  02/16/18 : 145 lb (65.8 kg)      REVIEW OF SYSTEMS:   GENERAL HEALTH: feels well no complaints other than above   SKIN: den issues and agrees to the plan.

## 2020-06-17 ENCOUNTER — HOSPITAL ENCOUNTER (OUTPATIENT)
Dept: ULTRASOUND IMAGING | Age: 45
Discharge: HOME | End: 2020-06-17
Attending: FAMILY MEDICINE
Payer: COMMERCIAL

## 2020-06-17 DIAGNOSIS — R10.2 PELVIC PAIN: ICD-10-CM

## 2020-06-17 PROCEDURE — 76830 TRANSVAGINAL US NON-OB: CPT | Performed by: FAMILY MEDICINE

## 2020-06-17 PROCEDURE — 93975 VASCULAR STUDY: CPT | Performed by: FAMILY MEDICINE

## 2020-06-17 PROCEDURE — 76856 US EXAM PELVIC COMPLETE: CPT | Performed by: FAMILY MEDICINE

## 2020-07-10 ENCOUNTER — TELEPHONE (OUTPATIENT)
Dept: FAMILY MEDICINE CLINIC | Facility: CLINIC | Age: 45
End: 2020-07-10

## 2020-07-10 NOTE — TELEPHONE ENCOUNTER
Pt states her nephew was over to her house on Monday and nephew's step father was tested the previous week and tested positive. Nephew was tested but test is not back yet. Pt was asking to be tested. She does not have any symptoms.     Forward to Dr. Jared Elizabeth

## 2020-07-10 NOTE — TELEPHONE ENCOUNTER
If her nephew is negative, she does not need to be tested. Most of the [place] has been exposed to someone who has been exposed at this point and we just don't have the testing capacity.    If her nephew is positive or she develops symptoms, they we c

## 2020-07-10 NOTE — TELEPHONE ENCOUNTER
Patient has been notified, verbalized understanding of information. Denies further questions. Pt will wait for nephew's test to come back.

## 2020-08-21 ENCOUNTER — OFFICE VISIT (OUTPATIENT)
Dept: FAMILY MEDICINE CLINIC | Facility: CLINIC | Age: 45
End: 2020-08-21
Payer: COMMERCIAL

## 2020-08-21 VITALS
WEIGHT: 152.5 LBS | HEIGHT: 64 IN | TEMPERATURE: 98 F | SYSTOLIC BLOOD PRESSURE: 124 MMHG | RESPIRATION RATE: 18 BRPM | BODY MASS INDEX: 26.03 KG/M2 | DIASTOLIC BLOOD PRESSURE: 62 MMHG | HEART RATE: 70 BPM | OXYGEN SATURATION: 100 %

## 2020-08-21 DIAGNOSIS — G56.82 PINCHED NERVE IN SHOULDER, LEFT: ICD-10-CM

## 2020-08-21 DIAGNOSIS — M62.838 MUSCLE SPASM: Primary | ICD-10-CM

## 2020-08-21 PROCEDURE — 3074F SYST BP LT 130 MM HG: CPT | Performed by: FAMILY MEDICINE

## 2020-08-21 PROCEDURE — 3008F BODY MASS INDEX DOCD: CPT | Performed by: FAMILY MEDICINE

## 2020-08-21 PROCEDURE — 99214 OFFICE O/P EST MOD 30 MIN: CPT | Performed by: FAMILY MEDICINE

## 2020-08-21 PROCEDURE — 3078F DIAST BP <80 MM HG: CPT | Performed by: FAMILY MEDICINE

## 2020-08-21 RX ORDER — METHYLPREDNISOLONE 4 MG/1
TABLET ORAL
Qty: 1 KIT | Refills: 0 | Status: SHIPPED | OUTPATIENT
Start: 2020-08-21 | End: 2021-05-25 | Stop reason: ALTCHOICE

## 2020-08-21 NOTE — PROGRESS NOTES
Milad Lee is a 41year old female. Patient presents with:  Pain: neck       HPI:   Almost 2 weeks ago started having pain from left neck radiating down her left arms. Sunday night she tried and suddenly felt the pain.    Her fingers will go numb on tobacco: Never Used    Alcohol use:  Yes      Alcohol/week: 0.0 standard drinks      Comment: social -monthly    Drug use: No       BP Readings from Last 6 Encounters:  08/21/20 : 124/62  06/16/20 : 130/84  09/26/19 : 130/80  04/05/19 : 112/78  06/19/18 : 1 getting better in a week, refer to PT. No orders of the defined types were placed in this encounter.               Meds & Refills for this Visit:  Requested Prescriptions     Signed Prescriptions Disp Refills   • methylPREDNISolone (MEDROL) 4 MG Oral

## 2020-08-26 ENCOUNTER — PATIENT MESSAGE (OUTPATIENT)
Dept: FAMILY MEDICINE CLINIC | Facility: CLINIC | Age: 45
End: 2020-08-26

## 2020-08-26 NOTE — TELEPHONE ENCOUNTER
From: Caryn Davila  To: Hector Mckeon DO  Sent: 8/26/2020 11:27 AM CDT  Subject: Prescription Question    Good morning Dr Zachary Kelley,    Holly Acosta still having a very difficult time sleeping with the pain from my neck that goes down my arm.  The muscle relaxer h

## 2020-09-05 DIAGNOSIS — R53.82 CHRONIC FATIGUE: ICD-10-CM

## 2020-09-05 DIAGNOSIS — R63.5 WEIGHT GAIN: ICD-10-CM

## 2020-09-08 DIAGNOSIS — R63.5 WEIGHT GAIN: ICD-10-CM

## 2020-09-08 DIAGNOSIS — R53.82 CHRONIC FATIGUE: ICD-10-CM

## 2020-09-08 RX ORDER — BUPROPION HYDROCHLORIDE 150 MG/1
TABLET ORAL
Qty: 90 TABLET | Refills: 1 | Status: SHIPPED | OUTPATIENT
Start: 2020-09-08 | End: 2020-09-10 | Stop reason: DRUGHIGH

## 2020-09-08 NOTE — TELEPHONE ENCOUNTER
Is patient taking the medication anymore? Note on the Bupropion 300mg  in the medication list- Not taking.    Bupropion 150mg is not on her current medication list

## 2020-09-10 RX ORDER — BUPROPION HYDROCHLORIDE 300 MG/1
TABLET ORAL
Qty: 30 TABLET | Refills: 2 | Status: SHIPPED | OUTPATIENT
Start: 2020-09-10 | End: 2021-07-09

## 2021-01-26 ENCOUNTER — TELEPHONE (OUTPATIENT)
Dept: FAMILY MEDICINE CLINIC | Facility: CLINIC | Age: 46
End: 2021-01-26

## 2021-01-26 DIAGNOSIS — Z12.31 ENCOUNTER FOR SCREENING MAMMOGRAM FOR MALIGNANT NEOPLASM OF BREAST: Primary | ICD-10-CM

## 2021-01-26 NOTE — TELEPHONE ENCOUNTER
Patient overdue for mammogram.  Letter sent. Please place order. Last seen on 8/21/20.     Mammogram due on 04/18/2016  Annual Physical due on 09/08/2017  Pap Smear,3 Years due on 09/08/2019  Annual Depression Screen due on 09/26/2020  Influenza Vaccine

## 2021-03-05 DIAGNOSIS — G56.82 PINCHED NERVE IN SHOULDER, LEFT: ICD-10-CM

## 2021-03-05 DIAGNOSIS — M62.838 MUSCLE SPASM: ICD-10-CM

## 2021-03-05 RX ORDER — METHYLPREDNISOLONE 4 MG/1
TABLET ORAL
Qty: 1 KIT | Refills: 0 | Status: CANCELLED | OUTPATIENT
Start: 2021-03-05

## 2021-03-06 ENCOUNTER — TELEMEDICINE (OUTPATIENT)
Dept: TELEHEALTH | Age: 46
End: 2021-03-06
Payer: COMMERCIAL

## 2021-03-06 DIAGNOSIS — Z02.9 ADMINISTRATIVE ENCOUNTER: Primary | ICD-10-CM

## 2021-03-06 PROCEDURE — 99499 UNLISTED E&M SERVICE: CPT | Performed by: NURSE PRACTITIONER

## 2021-03-09 ENCOUNTER — TELEMEDICINE (OUTPATIENT)
Dept: FAMILY MEDICINE CLINIC | Facility: CLINIC | Age: 46
End: 2021-03-09
Payer: COMMERCIAL

## 2021-03-09 DIAGNOSIS — R42 DIZZINESS: ICD-10-CM

## 2021-03-09 DIAGNOSIS — R63.5 WEIGHT GAIN: ICD-10-CM

## 2021-03-09 DIAGNOSIS — J01.00 ACUTE NON-RECURRENT MAXILLARY SINUSITIS: Primary | ICD-10-CM

## 2021-03-09 PROCEDURE — 99214 OFFICE O/P EST MOD 30 MIN: CPT | Performed by: FAMILY MEDICINE

## 2021-03-09 RX ORDER — METHYLPREDNISOLONE 4 MG/1
TABLET ORAL
Qty: 1 KIT | Refills: 0 | Status: SHIPPED | OUTPATIENT
Start: 2021-03-09 | End: 2021-05-25 | Stop reason: ALTCHOICE

## 2021-03-09 RX ORDER — AMOXICILLIN AND CLAVULANATE POTASSIUM 875; 125 MG/1; MG/1
1 TABLET, FILM COATED ORAL 2 TIMES DAILY
Qty: 20 TABLET | Refills: 0 | Status: SHIPPED | OUTPATIENT
Start: 2021-03-09 | End: 2021-03-19

## 2021-03-09 NOTE — PROGRESS NOTES
This is a telemedicine visit with live, interactive video and audio. Patient understands and accepts financial responsibility for any deductible, co-insurance and/or co-pays associated with this service.     SUBJECTIVE  Pressure in sinuses, ringing ears • Hypertension Maternal Grandfather    • Diabetes Maternal Grandfather    • Cancer Maternal Aunt         breast      Social History    Tobacco Use      Smoking status: Never Smoker      Smokeless tobacco: Never Used    Alcohol use:  Yes      Alcohol/week 4 MG Oral Tablet Therapy Pack; As directed. Weight gain    advised to make appt, we should do some labs again to make sure we're not missing anything. Treat sinus infection as above.        Claudette Keller, DO    Please note that the following visit was

## 2021-03-10 ENCOUNTER — LAB ENCOUNTER (OUTPATIENT)
Dept: LAB | Age: 46
End: 2021-03-10
Attending: FAMILY MEDICINE
Payer: COMMERCIAL

## 2021-03-10 DIAGNOSIS — J01.00 ACUTE NON-RECURRENT MAXILLARY SINUSITIS: ICD-10-CM

## 2021-03-12 LAB — SARS-COV-2 RNA RESP QL NAA+PROBE: NOT DETECTED

## 2021-03-26 ENCOUNTER — TELEPHONE (OUTPATIENT)
Dept: FAMILY MEDICINE CLINIC | Facility: CLINIC | Age: 46
End: 2021-03-26

## 2021-03-26 NOTE — TELEPHONE ENCOUNTER
Overdue result letter mailed to patient   Lab Frequency Next Occurrence   ROYCE SCREENING BILAT (CPT=77067) Once 01/26/2021

## 2021-04-02 ENCOUNTER — TELEMEDICINE (OUTPATIENT)
Dept: FAMILY MEDICINE CLINIC | Facility: CLINIC | Age: 46
End: 2021-04-02

## 2021-04-02 DIAGNOSIS — H93.12 TINNITUS OF LEFT EAR: ICD-10-CM

## 2021-04-02 DIAGNOSIS — J01.01 ACUTE RECURRENT MAXILLARY SINUSITIS: Primary | ICD-10-CM

## 2021-04-02 PROCEDURE — 99214 OFFICE O/P EST MOD 30 MIN: CPT | Performed by: FAMILY MEDICINE

## 2021-04-02 RX ORDER — CEFDINIR 300 MG/1
300 CAPSULE ORAL 2 TIMES DAILY
Qty: 20 CAPSULE | Refills: 0 | Status: SHIPPED | OUTPATIENT
Start: 2021-04-02 | End: 2021-04-12

## 2021-04-02 NOTE — PROGRESS NOTES
This is a telemedicine visit with live, interactive video and audio. Patient understands and accepts financial responsibility for any deductible, co-insurance and/or co-pays associated with this service.     SUBJECTIVE  Still having a lot of ringing in Alcohol/week: 0.0 standard drinks      Comment: social -monthly    Drug use: No         Latex                   RASH    Comment:Swelling where latex has touched  Pork Derived Produc*    HIVES, SWELLING   Current Outpatient Medications   Medication Sig Disp relationship, due to the ongoing public health crisis/national emergency and because of restrictions of visitation. There are limitations of this visit as no physical exam could be performed.   Every conscious effort was taken to allow for sufficient and a

## 2021-04-26 ENCOUNTER — MED REC SCAN ONLY (OUTPATIENT)
Dept: FAMILY MEDICINE CLINIC | Facility: CLINIC | Age: 46
End: 2021-04-26

## 2021-05-18 ENCOUNTER — PATIENT MESSAGE (OUTPATIENT)
Dept: FAMILY MEDICINE CLINIC | Facility: CLINIC | Age: 46
End: 2021-05-18

## 2021-05-18 DIAGNOSIS — R30.0 DYSURIA: Primary | ICD-10-CM

## 2021-05-18 NOTE — TELEPHONE ENCOUNTER
Patient notified and verbalized understanding. States she can not come today. Confirmed with patient symptoms are not worsening. States she is not having any fever, back pain or blood in the urine. Says she is fine right now.     Scheduled appt for aminah

## 2021-05-18 NOTE — TELEPHONE ENCOUNTER
Urine culture order placed. Will wait for results, but let me know if she is feeling worse tomorrow and I can send something in the mean time.

## 2021-05-18 NOTE — TELEPHONE ENCOUNTER
From: Iglesia Offer  To: Geneva Gee DO  Sent: 5/18/2021 9:25 AM CDT  Subject: Non-Urgent Medical Question    Hi Dr Tremaine Castrejon,  I wasn’t sure if I need a video appointment, I believe I have an uti. . same symptoms from before, burning and pain when urina

## 2021-05-19 ENCOUNTER — NURSE ONLY (OUTPATIENT)
Dept: FAMILY MEDICINE CLINIC | Facility: CLINIC | Age: 46
End: 2021-05-19
Payer: COMMERCIAL

## 2021-05-19 DIAGNOSIS — R30.0 DYSURIA: ICD-10-CM

## 2021-05-19 PROCEDURE — 87086 URINE CULTURE/COLONY COUNT: CPT | Performed by: FAMILY MEDICINE

## 2021-05-19 NOTE — PATIENT INSTRUCTIONS
Urine sent for culture. Patient states that she felt worse this morning- she took another Azo and felt better. She will wait for the urine culture results. Patient was advised that the results can take up to 48 hours.   Patient advised to keep Dr. Abdullahi Ray u

## 2021-05-25 ENCOUNTER — OFFICE VISIT (OUTPATIENT)
Dept: FAMILY MEDICINE CLINIC | Facility: CLINIC | Age: 46
End: 2021-05-25
Payer: COMMERCIAL

## 2021-05-25 ENCOUNTER — HOSPITAL ENCOUNTER (OUTPATIENT)
Dept: MAMMOGRAPHY | Age: 46
Discharge: HOME | End: 2021-05-25
Attending: FAMILY MEDICINE
Payer: COMMERCIAL

## 2021-05-25 VITALS
HEART RATE: 66 BPM | DIASTOLIC BLOOD PRESSURE: 76 MMHG | TEMPERATURE: 100 F | BODY MASS INDEX: 26.98 KG/M2 | HEIGHT: 64 IN | RESPIRATION RATE: 16 BRPM | OXYGEN SATURATION: 100 % | SYSTOLIC BLOOD PRESSURE: 110 MMHG | WEIGHT: 158 LBS

## 2021-05-25 DIAGNOSIS — Z82.49 FAMILY HISTORY OF HEART DISEASE IN FEMALE FAMILY MEMBER BEFORE AGE 65: ICD-10-CM

## 2021-05-25 DIAGNOSIS — Z01.419 WELL WOMAN EXAM WITH ROUTINE GYNECOLOGICAL EXAM: Primary | ICD-10-CM

## 2021-05-25 DIAGNOSIS — Z12.31 ENCOUNTER FOR SCREENING MAMMOGRAM FOR MALIGNANT NEOPLASM OF BREAST: ICD-10-CM

## 2021-05-25 DIAGNOSIS — L65.9 HAIR LOSS: ICD-10-CM

## 2021-05-25 PROCEDURE — 99396 PREV VISIT EST AGE 40-64: CPT | Performed by: FAMILY MEDICINE

## 2021-05-25 PROCEDURE — 80050 GENERAL HEALTH PANEL: CPT | Performed by: FAMILY MEDICINE

## 2021-05-25 PROCEDURE — 3078F DIAST BP <80 MM HG: CPT | Performed by: FAMILY MEDICINE

## 2021-05-25 PROCEDURE — 3008F BODY MASS INDEX DOCD: CPT | Performed by: FAMILY MEDICINE

## 2021-05-25 PROCEDURE — 88175 CYTOPATH C/V AUTO FLUID REDO: CPT | Performed by: FAMILY MEDICINE

## 2021-05-25 PROCEDURE — 80061 LIPID PANEL: CPT | Performed by: FAMILY MEDICINE

## 2021-05-25 PROCEDURE — 87624 HPV HI-RISK TYP POOLED RSLT: CPT | Performed by: FAMILY MEDICINE

## 2021-05-25 PROCEDURE — 3074F SYST BP LT 130 MM HG: CPT | Performed by: FAMILY MEDICINE

## 2021-05-25 PROCEDURE — 77067 SCR MAMMO BI INCL CAD: CPT | Performed by: FAMILY MEDICINE

## 2021-05-25 NOTE — PROGRESS NOTES
HPI:   Nico Tom is a 40year old female who presents for a complete physical exam. Symptoms: denies discharge, itching, burning or dysuria, periods are irregular. Patient complains of issues below. Aunt passed of breast cancer.    Mom and dad tablet 2      Past Medical History:   Diagnosis Date   • PONV (postoperative nausea and vomiting)       Past Surgical History:   Procedure Laterality Date   • COLONOSCOPY  02/2018    normal   • COLONOSCOPY N/A 2/22/2018    Procedure: COLONOSCOPY, POSSIBLE is 27.12 kg/m².    GENERAL: well developed, well nourished,in no apparent distress  SKIN: no rashes,no suspicious lesions  HEENT: atraumatic, normocephalic,ears and throat are clear  EYES:PERRLA, EOMI, conjunctiva are clear  NECK: supple,no adenopathy   DONOVAN encounter       Imaging & Consults:  CT CALCIUM SCORING

## 2021-06-24 ENCOUNTER — TELEPHONE (OUTPATIENT)
Dept: FAMILY MEDICINE CLINIC | Facility: CLINIC | Age: 46
End: 2021-06-24

## 2021-07-09 DIAGNOSIS — R53.82 CHRONIC FATIGUE: ICD-10-CM

## 2021-07-09 DIAGNOSIS — R63.5 WEIGHT GAIN: ICD-10-CM

## 2021-07-09 RX ORDER — BUPROPION HYDROCHLORIDE 300 MG/1
300 TABLET ORAL DAILY
Qty: 90 TABLET | Refills: 0 | Status: SHIPPED | OUTPATIENT
Start: 2021-07-09 | End: 2021-10-18

## 2021-07-09 NOTE — TELEPHONE ENCOUNTER
Fax from pharmacy requesting refill of bupropion  mg daily    Last refilled 9/10/2020 #30  2 refills

## 2021-10-17 DIAGNOSIS — R53.82 CHRONIC FATIGUE: ICD-10-CM

## 2021-10-17 DIAGNOSIS — R63.5 WEIGHT GAIN: ICD-10-CM

## 2021-10-18 RX ORDER — BUPROPION HYDROCHLORIDE 300 MG/1
300 TABLET ORAL DAILY
Qty: 90 TABLET | Refills: 1 | Status: SHIPPED | OUTPATIENT
Start: 2021-10-18 | End: 2022-04-18

## 2022-01-27 ENCOUNTER — OFFICE VISIT (OUTPATIENT)
Dept: FAMILY MEDICINE CLINIC | Facility: CLINIC | Age: 47
End: 2022-01-27
Payer: COMMERCIAL

## 2022-01-27 VITALS
HEIGHT: 64 IN | OXYGEN SATURATION: 98 % | SYSTOLIC BLOOD PRESSURE: 110 MMHG | TEMPERATURE: 98 F | HEART RATE: 90 BPM | DIASTOLIC BLOOD PRESSURE: 70 MMHG | BODY MASS INDEX: 27.83 KG/M2 | WEIGHT: 163 LBS | RESPIRATION RATE: 16 BRPM

## 2022-01-27 DIAGNOSIS — Z30.432 ENCOUNTER FOR REMOVAL OF INTRAUTERINE CONTRACEPTIVE DEVICE: Primary | ICD-10-CM

## 2022-01-27 DIAGNOSIS — Z30.011 ENCOUNTER FOR INITIAL PRESCRIPTION OF CONTRACEPTIVE PILLS: ICD-10-CM

## 2022-01-27 PROCEDURE — 3078F DIAST BP <80 MM HG: CPT | Performed by: FAMILY MEDICINE

## 2022-01-27 PROCEDURE — 3074F SYST BP LT 130 MM HG: CPT | Performed by: FAMILY MEDICINE

## 2022-01-27 PROCEDURE — 58301 REMOVE INTRAUTERINE DEVICE: CPT | Performed by: FAMILY MEDICINE

## 2022-01-27 PROCEDURE — 3008F BODY MASS INDEX DOCD: CPT | Performed by: FAMILY MEDICINE

## 2022-01-27 PROCEDURE — 99214 OFFICE O/P EST MOD 30 MIN: CPT | Performed by: FAMILY MEDICINE

## 2022-01-27 RX ORDER — NORETHINDRONE ACETATE AND ETHINYL ESTRADIOL 1MG-20(21)
1 KIT ORAL DAILY
Qty: 28 TABLET | Refills: 5 | Status: SHIPPED | OUTPATIENT
Start: 2022-01-27 | End: 2023-01-27

## 2022-01-27 NOTE — PROGRESS NOTES
Lisa Sargent is a 40year old female. Patient presents with:  IUD: removal of IUD      HPI:   Has had IUD, mirena. It is due to be removed. She's had it for 5 yrs. Would like to have the IUD taken out. She has cramps all of the time.  She is tired o headaches    EXAM:   /70   Pulse 90   Temp 98 °F (36.7 °C) (Temporal)   Resp 16   Ht 5' 4\" (1.626 m)   Wt 163 lb (73.9 kg)   SpO2 98%   BMI 27.98 kg/m²  Body mass index is 27.98 kg/m².       GENERAL: well developed, well nourished,in no apparent dist

## 2022-03-22 ENCOUNTER — PATIENT MESSAGE (OUTPATIENT)
Dept: FAMILY MEDICINE CLINIC | Facility: CLINIC | Age: 47
End: 2022-03-22

## 2022-03-22 RX ORDER — SULFAMETHOXAZOLE AND TRIMETHOPRIM 800; 160 MG/1; MG/1
1 TABLET ORAL 2 TIMES DAILY
Qty: 6 TABLET | Refills: 0 | Status: SHIPPED | OUTPATIENT
Start: 2022-03-22 | End: 2022-03-25

## 2022-04-18 RX ORDER — BUPROPION HYDROCHLORIDE 300 MG/1
300 TABLET ORAL DAILY
Qty: 90 TABLET | Refills: 1 | Status: SHIPPED | OUTPATIENT
Start: 2022-04-18

## 2022-05-26 ENCOUNTER — TELEPHONE (OUTPATIENT)
Dept: FAMILY MEDICINE CLINIC | Facility: CLINIC | Age: 47
End: 2022-05-26

## 2022-05-26 DIAGNOSIS — Z12.31 BREAST CANCER SCREENING BY MAMMOGRAM: Primary | ICD-10-CM

## 2022-09-12 ENCOUNTER — TELEPHONE (OUTPATIENT)
Dept: FAMILY MEDICINE CLINIC | Facility: CLINIC | Age: 47
End: 2022-09-12

## 2022-09-12 ENCOUNTER — OFFICE VISIT (OUTPATIENT)
Dept: FAMILY MEDICINE CLINIC | Facility: CLINIC | Age: 47
End: 2022-09-12
Payer: COMMERCIAL

## 2022-09-12 VITALS
DIASTOLIC BLOOD PRESSURE: 80 MMHG | RESPIRATION RATE: 16 BRPM | WEIGHT: 168 LBS | BODY MASS INDEX: 28.68 KG/M2 | TEMPERATURE: 98 F | SYSTOLIC BLOOD PRESSURE: 128 MMHG | HEART RATE: 84 BPM | HEIGHT: 64 IN | OXYGEN SATURATION: 100 %

## 2022-09-12 DIAGNOSIS — T78.40XA ALLERGIC REACTION, INITIAL ENCOUNTER: Primary | ICD-10-CM

## 2022-09-12 DIAGNOSIS — R22.0 SWELLING OF FACE: ICD-10-CM

## 2022-09-12 PROCEDURE — 3079F DIAST BP 80-89 MM HG: CPT | Performed by: FAMILY MEDICINE

## 2022-09-12 PROCEDURE — 3008F BODY MASS INDEX DOCD: CPT | Performed by: FAMILY MEDICINE

## 2022-09-12 PROCEDURE — 3074F SYST BP LT 130 MM HG: CPT | Performed by: FAMILY MEDICINE

## 2022-09-12 PROCEDURE — 99214 OFFICE O/P EST MOD 30 MIN: CPT | Performed by: FAMILY MEDICINE

## 2022-09-12 RX ORDER — PREDNISONE 10 MG/1
TABLET ORAL
Qty: 30 TABLET | Refills: 0 | Status: SHIPPED | OUTPATIENT
Start: 2022-09-12

## 2022-09-12 NOTE — TELEPHONE ENCOUNTER
Pt has an appt today at 2;00  Pt would like to get in sooner today if possible. Believes she is having an allergic reaction that started yesterday. Took Benadryl and it is not helping. Please advise. Thank you!

## 2022-09-12 NOTE — TELEPHONE ENCOUNTER
I don't have time to see her sooner. If she is getting worse, the go to IC. She can take another dose of benadryl in another hour in the mean time. She can also take a claritin or zyrtec in addition to that.

## 2022-09-12 NOTE — TELEPHONE ENCOUNTER
Patient states she thinks she is having an allergic reaction to something. States yesterday morning she had swollen eyes and itching on her neck and other parts of the body as well. States still having symptoms today. States she took liquid benadryl (off brand) 20 ml at 730 this morning and it is not helping. Denies tongue swelling or trouble breathing/swallowing. States throat felt weird went swallowing medication but not having any difficulty. Would like to be seen sooner than 2.     Routed to  to advise

## 2022-10-18 ENCOUNTER — MED REC SCAN ONLY (OUTPATIENT)
Dept: FAMILY MEDICINE CLINIC | Facility: CLINIC | Age: 47
End: 2022-10-18

## 2022-12-02 ENCOUNTER — OFFICE VISIT (OUTPATIENT)
Dept: FAMILY MEDICINE CLINIC | Facility: CLINIC | Age: 47
End: 2022-12-02
Payer: COMMERCIAL

## 2022-12-02 VITALS
OXYGEN SATURATION: 99 % | DIASTOLIC BLOOD PRESSURE: 74 MMHG | HEIGHT: 64 IN | HEART RATE: 52 BPM | TEMPERATURE: 98 F | RESPIRATION RATE: 18 BRPM | SYSTOLIC BLOOD PRESSURE: 124 MMHG | WEIGHT: 166 LBS | BODY MASS INDEX: 28.34 KG/M2

## 2022-12-02 DIAGNOSIS — R68.89 FLU-LIKE SYMPTOMS: Primary | ICD-10-CM

## 2022-12-02 PROCEDURE — 3074F SYST BP LT 130 MM HG: CPT | Performed by: NURSE PRACTITIONER

## 2022-12-02 PROCEDURE — 3078F DIAST BP <80 MM HG: CPT | Performed by: NURSE PRACTITIONER

## 2022-12-02 PROCEDURE — 99213 OFFICE O/P EST LOW 20 MIN: CPT | Performed by: NURSE PRACTITIONER

## 2022-12-02 PROCEDURE — 3008F BODY MASS INDEX DOCD: CPT | Performed by: NURSE PRACTITIONER

## 2022-12-02 PROCEDURE — 87637 SARSCOV2&INF A&B&RSV AMP PRB: CPT | Performed by: NURSE PRACTITIONER

## 2022-12-02 RX ORDER — OSELTAMIVIR PHOSPHATE 75 MG/1
75 CAPSULE ORAL 2 TIMES DAILY
Qty: 10 CAPSULE | Refills: 0 | Status: SHIPPED | OUTPATIENT
Start: 2022-12-02 | End: 2022-12-07

## 2022-12-03 LAB
FLUAV + FLUBV RNA SPEC NAA+PROBE: NOT DETECTED
FLUAV + FLUBV RNA SPEC NAA+PROBE: NOT DETECTED
RSV RNA SPEC NAA+PROBE: NOT DETECTED
SARS-COV-2 RNA RESP QL NAA+PROBE: NOT DETECTED

## 2023-11-29 ENCOUNTER — OFFICE VISIT (OUTPATIENT)
Dept: FAMILY MEDICINE CLINIC | Facility: CLINIC | Age: 48
End: 2023-11-29
Payer: COMMERCIAL

## 2023-11-29 VITALS
OXYGEN SATURATION: 100 % | HEIGHT: 64 IN | HEART RATE: 92 BPM | DIASTOLIC BLOOD PRESSURE: 78 MMHG | TEMPERATURE: 98 F | RESPIRATION RATE: 18 BRPM | SYSTOLIC BLOOD PRESSURE: 122 MMHG | BODY MASS INDEX: 26.29 KG/M2 | WEIGHT: 154 LBS

## 2023-11-29 DIAGNOSIS — J02.9 SORE THROAT: Primary | ICD-10-CM

## 2023-11-29 LAB
CONTROL LINE PRESENT WITH A CLEAR BACKGROUND (YES/NO): YES YES/NO
KIT LOT #: NORMAL NUMERIC
STREP GRP A CUL-SCR: NEGATIVE

## 2023-11-29 PROCEDURE — 3078F DIAST BP <80 MM HG: CPT | Performed by: NURSE PRACTITIONER

## 2023-11-29 PROCEDURE — 87880 STREP A ASSAY W/OPTIC: CPT | Performed by: NURSE PRACTITIONER

## 2023-11-29 PROCEDURE — 3074F SYST BP LT 130 MM HG: CPT | Performed by: NURSE PRACTITIONER

## 2023-11-29 PROCEDURE — 99213 OFFICE O/P EST LOW 20 MIN: CPT | Performed by: NURSE PRACTITIONER

## 2023-11-29 PROCEDURE — 3008F BODY MASS INDEX DOCD: CPT | Performed by: NURSE PRACTITIONER

## 2023-11-29 PROCEDURE — 87081 CULTURE SCREEN ONLY: CPT | Performed by: NURSE PRACTITIONER

## 2023-11-29 RX ORDER — MONTELUKAST SODIUM 10 MG/1
TABLET ORAL
COMMUNITY
Start: 2023-06-19

## 2023-11-29 RX ORDER — EPINEPHRINE 0.3 MG/.3ML
0.3 INJECTION SUBCUTANEOUS
COMMUNITY
Start: 2023-03-02

## 2024-09-15 NOTE — PATIENT INSTRUCTIONS
1. Rest. Drink plenty of fluids. 2. Tylenol/Ibuprofen for pain. 3. Supportive care as discussed. 4. Use humidifier at home when possible. 5. The rapid strep test was negative today. We will send a throat culture to lab and call you with results in 3-4 days. 6. Covid-19/Viral testing declined. 7. Follow up with PMD in 4-5 days for re-eval. Go to the emergency department immediately if symptoms worsen, change, you develop chest discomfort, wheezing, shortness of breath, or if you have any concerns.
on the discharge service for the patient. I have reviewed and made amendments to the documentation where necessary.

## (undated) DIAGNOSIS — N30.00 ACUTE CYSTITIS WITHOUT HEMATURIA: Primary | ICD-10-CM

## (undated) DIAGNOSIS — R63.5 WEIGHT GAIN: ICD-10-CM

## (undated) DIAGNOSIS — R53.82 CHRONIC FATIGUE: ICD-10-CM

## (undated) DEVICE — ZIPWIRE GUIDEWIRE .035X150 STR

## (undated) DEVICE — TISSUE RETRIEVAL SYSTEM: Brand: INZII RETRIEVAL SYSTEM

## (undated) DEVICE — 3M(TM) TEGADERM(TM) TRANSPARENT FILM DRESSING FRAME STYLE 9505W: Brand: 3M™ TEGADERM™

## (undated) DEVICE — MONOFILAMENT ABSORBABLE SUTURE: Brand: MAXON

## (undated) DEVICE — SUTURE VICRYL 5-0 PC-1

## (undated) DEVICE — SOL  .9 1000ML BTL

## (undated) DEVICE — DRAPE C-ARM UNIVERSAL

## (undated) DEVICE — CHLORAPREP 26ML APPLICATOR

## (undated) DEVICE — KENDALL SCD EXPRESS SLEEVES, KNEE LENGTH, MEDIUM: Brand: KENDALL SCD

## (undated) DEVICE — TROCAR: Brand: KII SHIELDED BLADED ACCESS SYSTEM

## (undated) DEVICE — LAP CHOLE/APPY CDS-LF: Brand: MEDLINE INDUSTRIES, INC.

## (undated) DEVICE — ENDOPATH 5MM CURVED SCISSORS WITH MONOPOLAR CAUTERY: Brand: ENDOPATH

## (undated) DEVICE — LIGAMAX 5 MM ENDOSCOPIC MULTIPLE CLIP APPLIER: Brand: LIGAMAX

## (undated) DEVICE — Device

## (undated) DEVICE — TROCAR: Brand: KII® SLEEVE

## (undated) DEVICE — GAUZE SPONGES,8 PLY: Brand: CURITY

## (undated) DEVICE — OPEN-END FLEXI-TIP URETERAL CATHETER: Brand: FLEXI-TIP

## (undated) DEVICE — STERILE POLYISOPRENE POWDER-FREE SURGICAL GLOVES: Brand: PROTEXIS

## (undated) NOTE — LETTER
05/26/22        Daxa Peña  [address] [facility]      Dear Henrietta Coe records indicate that you have outstanding testing that was ordered for you and has not yet been completed:  Lab Frequency Next Occurrence   ROYCE SCREENING BILAT ([id number]=[id number]) Once 05/26/2022       To provide you with the best possible care, please complete these orders at your earliest convenience. If you have recently completed these orders please disregard this letter. If you have any questions please call the office at [phone].     Thank you,       [facility]

## (undated) NOTE — LETTER
08/30/17      Stanislaw Echeverria  [address] [place]        Dear Stanislaw Echeverria      To help us provide the highest quality medical care, [facility] uses a sophisticated computer system to track our patient records.  During

## (undated) NOTE — LETTER
08/30/17      Chelita Tam  [address] [place]        Dear Chelita Tam      To help us provide the highest quality medical care, [facility] uses a sophisticated computer system to track our patient records.  During

## (undated) NOTE — LETTER
17    Patient: Nils Haley  : 1975 Visit date: 2017    Dear  Dr. Mike Jimenez,    Nils Haley presented to my office for follow-up. Please find my assessment and plan below.                Assessment   Follow-up examination  (

## (undated) NOTE — LETTER
01/27/20  [facility]  [address] [place] [id number]      Dear Katheryn Reyez. To help us provide the highest quality medical care, [facility] uses a sophisticated computer system to track our patient records.  During a revi

## (undated) NOTE — LETTER
[address]    1/6/2020      Dear  Katheryn Reyez    In order to provide the highest quality care, [facility] uses a sophisticated computer system to track our patient's r

## (undated) NOTE — LETTER
03/26/21          Corine Bowen   [address] [facility]           Dear Hossein Garcia records indicate that you have outstanding lab work and or testing that was ordered for you and has not yet been completed:  Lab Frequency Ne

## (undated) NOTE — LETTER
01/26/21        Alfredo Beltrán  [address]  [address] [facility]      Dear Alfredo Beltrán,    To help us provide the highest quality medical care, [facility] uses a sophisticated computer system to track our patient records.  During

## (undated) NOTE — LETTER
10/16/17    Patient: Esteban Vásquez  : 1975 Visit date: 10/16/2017    Dear  Dr. Scottie Dennis, ,     Esteban Vásquez resented to my office in follow-up for a recent ER visit. Vicente Vega Please find my assessment and plan below.             Assessment   [facility]

## (undated) NOTE — LETTER
17    Patient: Antonioac Thakkarshelley  : 1975 Visit date: 2017    Dear  Dr. Sharyn Zavala,     Antonio Perez presented to my office for follow-up. Please find my assessment and plan below.              Assessment   Follow-up examination  (p